# Patient Record
Sex: MALE | URBAN - METROPOLITAN AREA
[De-identification: names, ages, dates, MRNs, and addresses within clinical notes are randomized per-mention and may not be internally consistent; named-entity substitution may affect disease eponyms.]

---

## 2022-06-26 PROBLEM — R97.20 ELEVATED PSA: Status: ACTIVE | Noted: 2022-06-26

## 2022-09-21 ENCOUNTER — DOCUMENTATION ONLY (OUTPATIENT)
Dept: ADMINISTRATIVE | Facility: OTHER | Age: 67
End: 2022-09-21

## 2022-09-26 NOTE — PROGRESS NOTES
NAME: Wong Mojica   : 1955 SEX: GRACIE MR#: [id number]   DIAGNOSIS: Metastatic adenocarcinoma of the prostate diagnosed in 2022.  Group clinical stage IVB  cT3b (physical exam and MR scan show right extracapsular extension and right seminal vesicle involvement)  cN1 (PSMA PET-CT scan shows strong evidence for a 12 mm left external iliac hiral metastasis)  cM1c (PSMA PET-CT scan shows strong evidence for rib metastasis, multiple lung metastases and metastasis to the right common iliac node).  [place] score of 4 + 5 = 9.  PSA = 20.2.   REFERRING PHYSICIAN: Francie Calderon M.D.   DATE OF CONSULTATION: 2022     PRESENT ILLNESS:  The patient is a 66-year-old single white male.  Screening PSA in 2022 had increased, from the previous PSA of 4.5, to a level of 13.5.  The patient declined biopsy at that time and moved from his previous home in [place] to the [facility].     On  of this year, the patient changed his urologic care to Dr. Harper.  He was found to have kidney stones at that time and Dr. Harper cared for the patient during this time.  Dr. Harper's digital rectal examination was suspicious.     On  of this year, MR scan of the prostate was performed with and without IV contrast.  I have reviewed the images, as well as read the radiologist's impression.  The prostate size is 60 cc.  There is a very aggressive-appearing 25 mm lesion involving almost the entire right peripheral zone, as well as a significant amount of the left peripheral zone.  This is rated as a PI-RADS 5/5.  There is evidence of invasion into the proximal right seminal vesicle and there is 3 mm of extracapsular extension.  There is no lymphadenopathy.     On  of this year, an MR fusion biopsy was performed.  Fusion biopsy  of the MR-detected lesion showed adenocarcinoma, Jos[place] score of 4 + 4 = 8.  Six out of six routine right prostate biopsies were positive and these included  Josiah score of 4 + 5 = 9, four of the biopsies were at least Josiah score of 8.  Five out of six left prostate biopsies were also positive and this showed Josiah score of 4 + 4 = 8 in three of the biopsies.     On August 1 this year, PSMA PET-CT scan was performed at the [facility].  I have reviewed the images, as well as read the radiologist's interpretation.  There is significant hypermetabolic activity in the prostate consistent with known prostate cancer.  There are multiple hypermetabolic lung nodules highly suggestive of multiple lung metastases.  There is a hypermetabolic 9 mm right common iliac node, a hypermetabolic 12 mm left external iliac node and a hypermetabolic left posterior fifth rib.     On August 6, the patient saw Dr. Harper, was referred to Dr. Calderon, as well as Dr. Caldwell.  I asked the patient and the patient said that he actually never did follow through with the referral to Dr. Caldwell, although Dr. Calderon has spoken with Dr. Caldwell about this case.     On August 19, the patient saw Dr. Calderon.  Dr. Calderon had requested a lung biopsy.     On August 30, a CT scan of the chest was performed with IV contrast.  Widespread nodules were seen throughout the lung, all less than 1 cm in size and these were highly suggestive of lung metastases.  There is also a sclerotic lesion in the left posterior fifth rib consistent with prostate cancer metastasis.     On September 6, the patient followed up with Dr. Calderon.  Since the patient has seen Dr. Calderon last, Dr. Calderon had spoken with Dr. Caldwell about this case.  Dr. Caldwell recommended treatment with androgen deprivation therapy and two years of abiraterone.  Dr. Calderon noted that a lung biopsy was not possible since the lesion was too small.     On September 6, the patient received his first three-month LHRH agonist injection.  The patient has a prescription for Zytiga, although it has not started yet.  Current comprehensive  metabolic profile shows a BUN of 28, a creatinine 1.55, total bilirubin of 2.8, ALT of 235, AST of 290.  August 2022 PSA equaled 20.2.   The patient has been referred for Radiation Oncology consultation.     The patient denies any symptoms.  His current International Prostate Symptom score is 10 which corresponds with moderate bladder outlet obstruction symptoms.  The patient is mostly satisfied with his quality of life due to urinary symptoms.  His Sexual Health Inventory score for men is 1 which corresponds with severe erectile dysfunction.  The patient denies neurologic problems.  He remains active and has a good performance status.  His weight has gone from 205 pounds six months ago to 210 pounds currently.    PAST MEDICAL HISTORY:  The patient's filled-out forms include symptoms, as well as past medical history and relevant factors have been reviewed.     Medical:  Adenocarcinoma of the prostate with metastasis, history of kidney stones, hypertension, diabetes mellitus, renal insufficiency.     No previous radiation therapy or chemotherapy history.    PREVIOUS SURGERIES:  Stent in kidney, bladder stone removal.    FAMILY HISTORY:  Positive for Father with liver cancer, Mother with lung cancer, and a brother with tonsil cancer.    TOBACCO HISTORY:  Negative.    ALCOHOL HISTORY:  Occasional.    ALLERGIES:  No known allergies.    MEDICATIONS:  The patient is on about seven medications and they are recorded in the clinic records.    PHYSICAL EXAMINATION:  GENERAL:  Shows a man in no distress.  He has a good performance status (performance score = 0).    LYMPH NODES:  There is no supraclavicular or groin lymphadenopathy.   ABDOMEN:  Shows no masses.   DIGITAL RECTAL:  Very abnormal.  There is a large cancer involving the right prostate with definite right extracapsular extension and extension into the right seminal vesicle (this is the same findings as shown on the MR scan).    LAB AND X-RAYS:  See Present Illness  section.    ASSESSMENT/GOALS/PLAN:  The patient has recently been diagnosed with metastatic adenocarcinoma of the prostate diagnosed in June 2022.  Group clinical stage IVB  cT3b (physical exam and MR scan show right extracapsular extension and right seminal vesicle involvement)  cN1 (PSMA PET-CT scan shows strong evidence for a 12 mm left external iliac hiral metastasis)  cM1c (PSMA PET-CT scan shows strong evidence for rib metastasis, multiple lung metastases and metastasis to the right common iliac node).  [place] score of 4 + 5 = 9.  PSA = 20.2.    I have discussed this case with Dr. Calderon and I have reviewed the NCCN guidelines and discussed the situation with the patient.  The patient has very strong evidence for bone, multiple lung metastases, as well as distant hiral metastasis.  He also has large volume prostate cancer.  As per NCCN guidelines, the treatment that has been started by Dr. Calderon with androgen deprivation therapy and abiraterone is very appropriate, and is the mainstay of treatment.  Dr. Calderon asked me to consider the possibility of irradiation to the primary tumor as was published in the STAMPEDE trial.  The lung metastases are  visceral metastases and  that would have been an exclusion from this STAMPEDE trial, I think that it is wisest to proceed with androgen deprivation therapy and abiraterone.  In the future, palliative irradiation could be considered, although by far the mainstay of treatment is systemic therapy with androgen deprivation therapy and abiraterone.  I have asked to see the patient back in four months and we can reconsider the possibility of irradiation, although  I am inclined against irradiation  for multiple reasons, including the previously mentioned fact that visceral metastases such as lung metastases were an exclusion for being treated to the prostate in patients with known distant metastasis.       Well over 105 minutes was spent on this consultation, of which  40 minutes was spent in record and image review, 50 minutes was spent directly with the patient, and well over 15 minutes was spent in documentation and guideline review.   I appreciate the opportunity to consult on your patients.  Please call me with any questions or comments.      Electronically Signed By:  CELSO Gonzalez/Cody  DD:  09/22/2022  DT:  09/26/2022  Job #:  [id number]    CC:  Neeraj Thompson III, MD, [address] [place] [phone], Fax: [phone]        Yordan Harper M.D., [address] [place] [id number], Fax: [phone]

## 2025-03-13 ENCOUNTER — OUTSIDE PLACE OF SERVICE (OUTPATIENT)
Dept: ADMINISTRATIVE | Facility: OTHER | Age: 70
End: 2025-03-13
Payer: COMMERCIAL

## 2025-03-13 PROCEDURE — 99223 1ST HOSP IP/OBS HIGH 75: CPT | Mod: ,,, | Performed by: STUDENT IN AN ORGANIZED HEALTH CARE EDUCATION/TRAINING PROGRAM
